# Patient Record
Sex: MALE | Race: OTHER | Employment: FULL TIME | URBAN - METROPOLITAN AREA
[De-identification: names, ages, dates, MRNs, and addresses within clinical notes are randomized per-mention and may not be internally consistent; named-entity substitution may affect disease eponyms.]

---

## 2017-08-21 ENCOUNTER — TELEPHONE (OUTPATIENT)
Dept: OTHER | Age: 27
End: 2017-08-21

## 2017-08-21 DIAGNOSIS — M54.41 CHRONIC RIGHT-SIDED LOW BACK PAIN WITH RIGHT-SIDED SCIATICA: Primary | ICD-10-CM

## 2017-08-21 DIAGNOSIS — G89.29 CHRONIC RIGHT-SIDED LOW BACK PAIN WITH RIGHT-SIDED SCIATICA: Primary | ICD-10-CM

## 2017-08-21 NOTE — TELEPHONE ENCOUNTER
Pt had 2 steroid injections for lumbar pain.   The injection works but is temporary,  Requesting referral to see either ortho or chiropractor

## 2018-02-18 ENCOUNTER — HOSPITAL ENCOUNTER (OUTPATIENT)
Age: 28
Discharge: HOME | End: 2018-02-18
Attending: FAMILY MEDICINE
Payer: COMMERCIAL

## 2018-02-18 ENCOUNTER — APPOINTMENT (OUTPATIENT)
Dept: GENERAL RADIOLOGY | Age: 28
End: 2018-02-18
Attending: FAMILY MEDICINE
Payer: COMMERCIAL

## 2018-02-18 VITALS
TEMPERATURE: 97 F | SYSTOLIC BLOOD PRESSURE: 119 MMHG | WEIGHT: 210 LBS | BODY MASS INDEX: 29.4 KG/M2 | OXYGEN SATURATION: 99 % | HEIGHT: 71 IN | HEART RATE: 85 BPM | RESPIRATION RATE: 20 BRPM | DIASTOLIC BLOOD PRESSURE: 74 MMHG

## 2018-02-18 DIAGNOSIS — S93.402A MILD SPRAIN OF LEFT ANKLE, INITIAL ENCOUNTER: Primary | ICD-10-CM

## 2018-02-18 PROCEDURE — 99213 OFFICE O/P EST LOW 20 MIN: CPT

## 2018-02-18 PROCEDURE — 99203 OFFICE O/P NEW LOW 30 MIN: CPT

## 2018-02-18 PROCEDURE — 73610 X-RAY EXAM OF ANKLE: CPT | Performed by: FAMILY MEDICINE

## 2018-02-18 RX ORDER — NAPROXEN 500 MG/1
500 TABLET ORAL 2 TIMES DAILY WITH MEALS
Qty: 28 TABLET | Refills: 0 | Status: SHIPPED | OUTPATIENT
Start: 2018-02-18 | End: 2018-03-04

## 2018-02-18 RX ORDER — IBUPROFEN 600 MG/1
600 TABLET ORAL ONCE
Status: COMPLETED | OUTPATIENT
Start: 2018-02-18 | End: 2018-02-18

## 2018-02-18 NOTE — ED PROVIDER NOTES
Patient presents with:  Lower Extremity Injury (musculoskeletal)    HPI:   Mata Monge is a 33year old male present with complain of L ankle injury.   Time of injury: yesterday while playing soccer  Location: lateral mallleolus  Nature/type of injury: twi Intact  - sensations: intact    ASSESSMENT AND PLAN:   Ankle Sprain    PLAN:   L ankle X-ray --   CONCLUSION:   1. No fracture or dislocation. Mild lateral malleolar soft tissue swelling.     Radiology report discussed and copy given to patient          Res

## 2018-02-18 NOTE — ED INITIAL ASSESSMENT (HPI)
Pt complaints of left ankle pain and swelling since yesterday reports was playing soccer not sure how it happened. Pt reports pain with bearing weight, pain when sitting also. Arrives with ace bandage in place.

## 2018-06-05 ENCOUNTER — LAB ENCOUNTER (OUTPATIENT)
Dept: LAB | Age: 28
End: 2018-06-05
Attending: FAMILY MEDICINE
Payer: COMMERCIAL

## 2018-06-05 ENCOUNTER — OFFICE VISIT (OUTPATIENT)
Dept: FAMILY MEDICINE CLINIC | Facility: CLINIC | Age: 28
End: 2018-06-05

## 2018-06-05 ENCOUNTER — HOSPITAL ENCOUNTER (OUTPATIENT)
Dept: GENERAL RADIOLOGY | Age: 28
Discharge: HOME | End: 2018-06-05
Attending: FAMILY MEDICINE
Payer: COMMERCIAL

## 2018-06-05 VITALS
HEART RATE: 62 BPM | BODY MASS INDEX: 31.64 KG/M2 | TEMPERATURE: 97 F | DIASTOLIC BLOOD PRESSURE: 75 MMHG | SYSTOLIC BLOOD PRESSURE: 117 MMHG | WEIGHT: 226 LBS | HEIGHT: 71 IN

## 2018-06-05 DIAGNOSIS — R10.30 LOWER ABDOMINAL PAIN: ICD-10-CM

## 2018-06-05 DIAGNOSIS — R19.06 EPIGASTRIC FULLNESS: ICD-10-CM

## 2018-06-05 DIAGNOSIS — R14.2 BELCHING SYMPTOM: ICD-10-CM

## 2018-06-05 DIAGNOSIS — R12 HEARTBURN: Primary | ICD-10-CM

## 2018-06-05 DIAGNOSIS — R12 HEARTBURN: ICD-10-CM

## 2018-06-05 PROCEDURE — 83690 ASSAY OF LIPASE: CPT

## 2018-06-05 PROCEDURE — 80053 COMPREHEN METABOLIC PANEL: CPT

## 2018-06-05 PROCEDURE — 36415 COLL VENOUS BLD VENIPUNCTURE: CPT

## 2018-06-05 PROCEDURE — 99214 OFFICE O/P EST MOD 30 MIN: CPT | Performed by: FAMILY MEDICINE

## 2018-06-05 PROCEDURE — 74018 RADEX ABDOMEN 1 VIEW: CPT | Performed by: FAMILY MEDICINE

## 2018-06-05 PROCEDURE — 99212 OFFICE O/P EST SF 10 MIN: CPT | Performed by: FAMILY MEDICINE

## 2018-06-05 PROCEDURE — 85025 COMPLETE CBC W/AUTO DIFF WBC: CPT

## 2018-06-05 NOTE — PROGRESS NOTES
HPI:    Patient ID: Laurence Daniel is a 33year old male. HPI     Patient here with c/o heartburn and epigastric fullness. Patient states symptoms have been going on for about a year. He had heartburn and he has been taking omeprazole.   This is helped h INTERNAL  - CBC WITH DIFFERENTIAL WITH PLATELET; Future  - COMP METABOLIC PANEL (14); Future    3. Epigastric fullness    - GASTRO - INTERNAL  - CBC WITH DIFFERENTIAL WITH PLATELET; Future  - COMP METABOLIC PANEL (14); Future  - LIPASE; Future    4.  Lower

## 2018-06-21 ENCOUNTER — TELEPHONE (OUTPATIENT)
Dept: GASTROENTEROLOGY | Facility: CLINIC | Age: 28
End: 2018-06-21

## 2018-06-21 ENCOUNTER — OFFICE VISIT (OUTPATIENT)
Dept: GASTROENTEROLOGY | Facility: CLINIC | Age: 28
End: 2018-06-21

## 2018-06-21 VITALS
SYSTOLIC BLOOD PRESSURE: 112 MMHG | DIASTOLIC BLOOD PRESSURE: 60 MMHG | BODY MASS INDEX: 31.1 KG/M2 | HEIGHT: 71 IN | WEIGHT: 222.19 LBS | HEART RATE: 67 BPM

## 2018-06-21 DIAGNOSIS — R10.13 EPIGASTRIC DISCOMFORT: Primary | ICD-10-CM

## 2018-06-21 DIAGNOSIS — K21.9 GASTROESOPHAGEAL REFLUX DISEASE, ESOPHAGITIS PRESENCE NOT SPECIFIED: ICD-10-CM

## 2018-06-21 DIAGNOSIS — K59.00 CONSTIPATION, UNSPECIFIED CONSTIPATION TYPE: ICD-10-CM

## 2018-06-21 PROCEDURE — 99243 OFF/OP CNSLTJ NEW/EST LOW 30: CPT | Performed by: INTERNAL MEDICINE

## 2018-06-21 NOTE — TELEPHONE ENCOUNTER
LM for pt that OK to schedule US abdomen at this time by calling central scheduling at [phone], no PA is required b/c he has HMO. Told to c/b if he has further questions or concerns.

## 2018-06-21 NOTE — PATIENT INSTRUCTIONS
1. Schedule upper endoscopy (EGD) with monitored anesthesia care (MAC) with Dr. Alton Kaiser    2. Start miralax (generic)    Miralax  Start taking miralax (or generic equivalent) once a day, which you can buy over the counter.   To take this, mix a 1/2 capful

## 2018-06-21 NOTE — TELEPHONE ENCOUNTER
GI Staff:    Please obtain prior auth for ultrasound    Thanks    Cal George MD  [address] [facility] - Gastroenterology

## 2018-06-21 NOTE — TELEPHONE ENCOUNTER
Scheduled for:  EGD  Provider Name:   Date:  6-27-18  Location:  [facility]  Sedation:  MAC  Time:  PT aware [facility] will call with arrival time   Prep: NPO after midnight  Meds/Allergies Reconciled?:  yes  Diagnosis with codes:  Epigastric discomfort R10.13, GERD

## 2018-06-21 NOTE — H&P
[facility] - Gastroenterology                                                                                                  Clinic History and Physical Disp: 60 tablet Rfl: 2       Allergies:  No Known Allergies    ROS:   all 10 systems were reviewed and were negative except as noted in the HPI    PHYSICAL EXAM:   Blood pressure 112/60, pulse 67, height 5' 11\" (1.803 m), weight 222 lb 3.2 oz (100.8 kg). he/she does not have. Also, alternatives like surgical correction has associated higher risks and potential for gas/bloat symptoms subsequently and also high likelihood of still requiring PPIs post surgery.       Recommend:  -EGD with MAC  -ultrasound of th

## 2018-06-26 ENCOUNTER — TELEPHONE (OUTPATIENT)
Dept: GASTROENTEROLOGY | Facility: CLINIC | Age: 28
End: 2018-06-26

## 2018-06-26 NOTE — TELEPHONE ENCOUNTER
Received a call from outpatient surgery that patients referral for EGD tomorrow is still opened and won't be covered unless it is closed. I spoke with Rebecca Fuentes in [facility] and she will send it high priority to get it reviewed and closed.   I will follow

## 2018-06-27 ENCOUNTER — LAB REQUISITION (OUTPATIENT)
Dept: LAB | Facility: HOSPITAL | Age: 28
End: 2018-06-27
Payer: COMMERCIAL

## 2018-06-27 ENCOUNTER — TELEPHONE (OUTPATIENT)
Dept: GASTROENTEROLOGY | Facility: CLINIC | Age: 28
End: 2018-06-27

## 2018-06-27 DIAGNOSIS — Z01.89 ENCOUNTER FOR OTHER SPECIFIED SPECIAL EXAMINATIONS: ICD-10-CM

## 2018-06-27 PROCEDURE — 88312 SPECIAL STAINS GROUP 1: CPT | Performed by: INTERNAL MEDICINE

## 2018-06-27 PROCEDURE — 88305 TISSUE EXAM BY PATHOLOGIST: CPT | Performed by: INTERNAL MEDICINE

## 2018-06-27 RX ORDER — PANTOPRAZOLE SODIUM 40 MG/1
40 TABLET, DELAYED RELEASE ORAL
Qty: 90 TABLET | Refills: 1 | Status: SHIPPED | OUTPATIENT
Start: 2018-06-27 | End: 2018-08-26

## 2018-06-27 NOTE — TELEPHONE ENCOUNTER
Patient at [facility] today for EGD for gerd, epigastric pain, and bloating. Had LA grade C esophagitis. Discussed need start a PPI once daily. Sending new prescription.     Zach Jean MD  [facility] - Gastroenterology  6/27/2018  3:08 PM

## 2018-09-10 ENCOUNTER — HOSPITAL ENCOUNTER (OUTPATIENT)
Dept: ULTRASOUND IMAGING | Facility: HOSPITAL | Age: 28
Discharge: HOME | End: 2018-09-10
Attending: INTERNAL MEDICINE
Payer: COMMERCIAL

## 2018-09-10 DIAGNOSIS — R10.13 EPIGASTRIC DISCOMFORT: ICD-10-CM

## 2018-09-10 PROCEDURE — 76705 ECHO EXAM OF ABDOMEN: CPT | Performed by: INTERNAL MEDICINE

## 2018-09-13 ENCOUNTER — OFFICE VISIT (OUTPATIENT)
Dept: GASTROENTEROLOGY | Facility: CLINIC | Age: 28
End: 2018-09-13
Payer: COMMERCIAL

## 2018-09-13 VITALS
HEIGHT: 71 IN | SYSTOLIC BLOOD PRESSURE: 120 MMHG | DIASTOLIC BLOOD PRESSURE: 78 MMHG | WEIGHT: 226 LBS | RESPIRATION RATE: 18 BRPM | HEART RATE: 78 BPM | BODY MASS INDEX: 31.64 KG/M2

## 2018-09-13 DIAGNOSIS — K21.00 GASTROESOPHAGEAL REFLUX DISEASE WITH ESOPHAGITIS: Primary | ICD-10-CM

## 2018-09-13 DIAGNOSIS — R14.0 BLOATING: ICD-10-CM

## 2018-09-13 PROCEDURE — 99214 OFFICE O/P EST MOD 30 MIN: CPT | Performed by: INTERNAL MEDICINE

## 2018-09-13 RX ORDER — PANTOPRAZOLE SODIUM 20 MG/1
20 TABLET, DELAYED RELEASE ORAL
COMMUNITY

## 2018-09-13 NOTE — PROGRESS NOTES
[facility] - Gastroenterology                                                                                                  Clinic Progress Note    Patient pr (750 mg total) by mouth 2 (two) times daily. Disp: 60 tablet Rfl: 2       Allergies:  No Known Allergies    ROS:   all 10 systems were reviewed and were negative except as noted in the HPI    PHYSICAL EXAM:   Blood pressure 120/78, pulse 78, resp.  rate 18, DM. Discussed reasons for weight loss and implications of fatty liver which could include cirrhosis, though not at this time.     Recommend:  -pantoprazole by mouth once daily  -weight loss  -low FODMAP diet    Orders This Visit:  No orders of the defined t

## 2018-09-13 NOTE — PATIENT INSTRUCTIONS
1. Continue your pantoprazole/protonix by mouth once a day     Let me know if you need a refill or run out    2. Here is some information on the FODMAP diet we discussd    FODMAPs  FODMAP diet:     It was found out that certain short-chained carbohydrates c and raffinose) are chains of fructose with one galactose molecule on the end. They act much like fructans. Main galactans-rich foods are legumes (soy, beans, chickpeas, lentils), cabbage and brussel sprouts. • Disaccharides:   o Lactose (milk sugar).  Lact 5. Symptoms of vitamin and mineral deficiency  6. Headache, lethargy, depression  7.    Approach to Low-FODMAPs Diet  In unexplained chronic diarrhea or bloating, FODMAPs should be considered as a possible cause, so their amount in the diet should be [facility]

## 2019-03-04 ENCOUNTER — OFFICE VISIT (OUTPATIENT)
Dept: FAMILY MEDICINE CLINIC | Facility: CLINIC | Age: 29
End: 2019-03-04
Payer: COMMERCIAL

## 2019-03-04 ENCOUNTER — NURSE TRIAGE (OUTPATIENT)
Dept: OTHER | Age: 29
End: 2019-03-04

## 2019-03-04 VITALS
TEMPERATURE: 97 F | HEART RATE: 73 BPM | WEIGHT: 204 LBS | HEIGHT: 71 IN | DIASTOLIC BLOOD PRESSURE: 81 MMHG | SYSTOLIC BLOOD PRESSURE: 121 MMHG | BODY MASS INDEX: 28.56 KG/M2

## 2019-03-04 DIAGNOSIS — J01.90 ACUTE SINUSITIS, RECURRENCE NOT SPECIFIED, UNSPECIFIED LOCATION: Primary | ICD-10-CM

## 2019-03-04 DIAGNOSIS — R05.9 COUGH: ICD-10-CM

## 2019-03-04 PROCEDURE — 99213 OFFICE O/P EST LOW 20 MIN: CPT | Performed by: FAMILY MEDICINE

## 2019-03-04 PROCEDURE — 99212 OFFICE O/P EST SF 10 MIN: CPT | Performed by: FAMILY MEDICINE

## 2019-03-04 RX ORDER — GUAIFENESIN AND CODEINE PHOSPHATE 100; 10 MG/5ML; MG/5ML
5 SOLUTION ORAL NIGHTLY PRN
Qty: 118 ML | Refills: 0 | Status: SHIPPED
Start: 2019-03-04 | End: 2019-03-18

## 2019-03-04 RX ORDER — AMOXICILLIN 875 MG/1
875 TABLET, COATED ORAL 2 TIMES DAILY
Qty: 20 TABLET | Refills: 0 | Status: SHIPPED | OUTPATIENT
Start: 2019-03-04 | End: 2019-03-14

## 2019-03-04 NOTE — TELEPHONE ENCOUNTER
Action Requested: Summary for Provider     []  Critical Lab, Recommendations Needed  [] Need Additional Advice  []   FYI    []   Need Orders  [] Need Medications Sent to Pharmacy  []  Other     SUMMARY: appt scheduled today to see Dr Claire Lewis for fernando

## 2019-03-04 NOTE — PROGRESS NOTES
HPI:    Patient ID: Cristofer Alfonso is a 33year old male. HPI  Patient presents with:  Cough: X 4 days   Wheezing    Patient had cold symptoms for last 1 1/2 weeks  Cough has lingered since last 4 days with wheezing  Coughing mainly at night.   Has some na normal heart sounds. Pulmonary/Chest: Effort normal and breath sounds normal.              ASSESSMENT/PLAN:   Acute sinusitis, recurrence not specified, unspecified location  (primary encounter diagnosis)  Cough    1.  Acute sinusitis, recurrence not spec

## 2021-01-11 ENCOUNTER — OFFICE VISIT (OUTPATIENT)
Dept: DERMATOLOGY CLINIC | Facility: CLINIC | Age: 31
End: 2021-01-11
Payer: COMMERCIAL

## 2021-01-11 DIAGNOSIS — D23.9 BENIGN NEOPLASM OF SKIN, UNSPECIFIED LOCATION: ICD-10-CM

## 2021-01-11 DIAGNOSIS — D23.60 BENIGN NEOPLASM OF SKIN OF UPPER LIMB, INCLUDING SHOULDER, UNSPECIFIED LATERALITY: ICD-10-CM

## 2021-01-11 DIAGNOSIS — L72.0 EPIDERMAL CYST: ICD-10-CM

## 2021-01-11 DIAGNOSIS — D23.5 BENIGN NEOPLASM OF SKIN OF TRUNK, EXCEPT SCROTUM: ICD-10-CM

## 2021-01-11 DIAGNOSIS — D23.4 BENIGN NEOPLASM OF SCALP AND SKIN OF NECK: ICD-10-CM

## 2021-01-11 DIAGNOSIS — D23.30 BENIGN NEOPLASM OF SKIN OF FACE: ICD-10-CM

## 2021-01-11 DIAGNOSIS — D23.5 DERMATOFIBROMA OF BACK: Primary | ICD-10-CM

## 2021-01-11 PROCEDURE — 99202 OFFICE O/P NEW SF 15 MIN: CPT | Performed by: DERMATOLOGY

## 2021-01-18 NOTE — PROGRESS NOTES
Candie Gaytan is a 35year old male. HPI:     CC:  Patient presents with:  Lesion: New pt presents with hyperpigmented, raised lesions on back. Occasionally tender (3/10). Denies family and personal hx of skin ca.          Allergies:  Patient has no known a Gets together: Not on file        Attends Buddhist service: Not on file        Active member of club or organization: Not on file        Attends meetings of clubs or organizations: Not on file        Relationship status: Not on file      Intimate partner allergies reviewed as noted. ROS:  Denies any other systemic complaints. No new or changeing lesions other than noted above. No fevers, chills, night sweats, unusual sun sensitivity. No other skin complaints.         History, medications, allergies r contact us if this does flare. We will continue to grow. Few other small scattered nevi over the trunk neck face. Reassurance given continue sun protection. No suspicious lesions presently  Please refer to map for specific lesions.   See additional di

## 2021-02-08 ENCOUNTER — OFFICE VISIT (OUTPATIENT)
Dept: FAMILY MEDICINE CLINIC | Facility: CLINIC | Age: 31
End: 2021-02-08
Payer: COMMERCIAL

## 2021-02-08 VITALS
TEMPERATURE: 97 F | HEART RATE: 81 BPM | BODY MASS INDEX: 30.94 KG/M2 | DIASTOLIC BLOOD PRESSURE: 70 MMHG | HEIGHT: 71 IN | SYSTOLIC BLOOD PRESSURE: 130 MMHG | WEIGHT: 221 LBS

## 2021-02-08 DIAGNOSIS — F41.9 ANXIETY: ICD-10-CM

## 2021-02-08 DIAGNOSIS — G47.8 UNREFRESHED BY SLEEP: ICD-10-CM

## 2021-02-08 DIAGNOSIS — Z00.00 ADULT GENERAL MEDICAL EXAM: Primary | ICD-10-CM

## 2021-02-08 DIAGNOSIS — F51.04 PSYCHOPHYSIOLOGICAL INSOMNIA: ICD-10-CM

## 2021-02-08 DIAGNOSIS — Z23 NEED FOR VACCINATION: ICD-10-CM

## 2021-02-08 PROCEDURE — 3078F DIAST BP <80 MM HG: CPT | Performed by: FAMILY MEDICINE

## 2021-02-08 PROCEDURE — 99213 OFFICE O/P EST LOW 20 MIN: CPT | Performed by: FAMILY MEDICINE

## 2021-02-08 PROCEDURE — 90715 TDAP VACCINE 7 YRS/> IM: CPT | Performed by: FAMILY MEDICINE

## 2021-02-08 PROCEDURE — 90471 IMMUNIZATION ADMIN: CPT | Performed by: FAMILY MEDICINE

## 2021-02-08 PROCEDURE — 3008F BODY MASS INDEX DOCD: CPT | Performed by: FAMILY MEDICINE

## 2021-02-08 PROCEDURE — 99395 PREV VISIT EST AGE 18-39: CPT | Performed by: FAMILY MEDICINE

## 2021-02-08 PROCEDURE — 3075F SYST BP GE 130 - 139MM HG: CPT | Performed by: FAMILY MEDICINE

## 2021-02-08 RX ORDER — TRAZODONE HYDROCHLORIDE 50 MG/1
TABLET ORAL
Qty: 180 TABLET | Refills: 0 | Status: SHIPPED | OUTPATIENT
Start: 2021-02-08 | End: 2021-06-12

## 2021-02-08 NOTE — PROGRESS NOTES
Patient ID: Larissa Keller is a 35year old male. HPI  Patient presents with:  Sleep Problem: Melatonin 10 mg not working  Physical: not fasting     Last seen by me on 12/13/2016. Pt presents today for a physical but also c/o loss of sleep.     Pt is a have to remain on them for the rest of his life if he begins taking them. I counseled him on the safety of these medications and advised him that after one year, his dosage can be lowered and eventually he may no longer need the medication.  I counseled the 06/05/2018    CREATSERUM 0.84 06/05/2018    BUNCREA 7.1 (L) 06/05/2018    ANIONGAP 7 06/05/2018    GFRAA >60 06/05/2018    GFRNAA >60 06/05/2018    CA 9.4 06/05/2018     06/05/2018    K 4.0 06/05/2018     06/05/2018    CO2 29 06/05/2018    OSMO Worry: Not on file        Inability: Not on file      Transportation needs        Medical: Not on file        Non-medical: Not on file    Tobacco Use      Smoking status: Former Smoker        Types: Cigarettes      Smokeless tobacco: Never Used    [employer] Physical Exam      Physical Exam   Constitutional: He appears well-developed and well-nourished. No distress. Head: Normocephalic.    Right Ear: Tympanic membrane and ear canal normal.   Left Ear: Tympanic membrane and ear canal normal.   Nose: No mucos care of the children as he think so much at bedtime he is unable to go to sleep. Unrefreshed by sleep  -     traZODone HCl 50 MG Oral Tab; 1 by mouth nightly for sleep. If after 7 to 10 days still having trouble sleeping start taking 2 at the same time.

## 2021-02-09 ENCOUNTER — TELEPHONE (OUTPATIENT)
Dept: FAMILY MEDICINE CLINIC | Facility: CLINIC | Age: 31
End: 2021-02-09

## 2021-02-09 NOTE — TELEPHONE ENCOUNTER
Mary Julian, pharmacist at [facility] the Sertraline and Trazadone has a possible interaction. Using Trazadone together with Sertraline can increase the risk of serotonin syndrome. Please advise.

## 2021-04-15 DIAGNOSIS — F41.9 ANXIETY: ICD-10-CM

## 2021-04-15 NOTE — TELEPHONE ENCOUNTER
Current Outpatient Medications:   •  Sertraline HCl 50 MG Oral Tab, Take 1/2 tablet po daily for 1 week and then go to 1 full tablet each day from then on.  (for mood), Disp: 30 tablet, Rfl: 1  •

## 2021-06-10 DIAGNOSIS — F41.9 ANXIETY: ICD-10-CM

## 2021-06-11 NOTE — TELEPHONE ENCOUNTER
I called patient, not available. I left a voicemail, I will try again later before the end of the day.

## 2021-06-11 NOTE — TELEPHONE ENCOUNTER
He was supposed to follow-up with us 6 weeks after the initial encounter when I placed him on sertraline and he never did. I put him on for a video visit this Saturday at 9:30 AM which was my only open slot. All he has to do is return our text. Please inform him.

## 2021-06-12 ENCOUNTER — TELEPHONE (OUTPATIENT)
Dept: FAMILY MEDICINE CLINIC | Facility: CLINIC | Age: 31
End: 2021-06-12

## 2021-06-12 NOTE — PROGRESS NOTES
VIDEO VISIT PROGRESS NOTE  Todays date: 6/12/2021 9:03 AM        Most recent Nurse Triage message / Carolyn Grade message from patient:      Xiomara Walsh DO   Physician   Specialty:  Family Medicine   Telephone Encounter   Signed   Encounter Date:  6/10/2021 case we will be speaking to that designated person and all parties involved understand the disclaimer that goes along with the consent for the video check-in service as stated above. History of Present Illness:     Last seen by me on 2/8/2021.     Pt gatherings:   n/a  • Other:  n/a      Treatments tried: Zoloft     Symptoms since onset: Improving [x]   Worsening  []   Unchanged  []     Waxing/Waning  []  N/A  []            Physical Exam:   Limited examination due to this being a video visit       Pauly Grandmother    • Prostate Cancer Paternal Grandfather        Reviewed Social History:  Social History    Tobacco Use      Smoking status: Former Smoker        Types: Cigarettes      Smokeless tobacco: Never Used    Alcohol use: Not Currently      Comment: Electronically Signed: Orlando Castillo, 6/12/2021, 9:03 AM.    IIlya, DO,  personally performed the services described in this documentation. All medical record entries made by the scribe were at my direction and in my presence.   I have revi

## 2021-06-12 NOTE — TELEPHONE ENCOUNTER
Please set up a video visit follow-up with me in 6 weeks. It will be a medication/mood disorder follow-up.

## 2023-10-30 ENCOUNTER — HOSPITAL ENCOUNTER (OUTPATIENT)
Age: 33
Discharge: HOME | End: 2023-10-30

## 2023-10-30 VITALS
HEART RATE: 75 BPM | RESPIRATION RATE: 20 BRPM | OXYGEN SATURATION: 100 % | TEMPERATURE: 97 F | DIASTOLIC BLOOD PRESSURE: 85 MMHG | SYSTOLIC BLOOD PRESSURE: 132 MMHG

## 2023-10-30 DIAGNOSIS — J01.10 ACUTE NON-RECURRENT FRONTAL SINUSITIS: Primary | ICD-10-CM

## 2023-10-30 PROCEDURE — 99203 OFFICE O/P NEW LOW 30 MIN: CPT | Performed by: NURSE PRACTITIONER

## 2023-10-30 RX ORDER — PSEUDOEPHEDRINE HCL 120 MG/1
120 TABLET, FILM COATED, EXTENDED RELEASE ORAL EVERY 12 HOURS PRN
Qty: 10 TABLET | Refills: 0 | Status: SHIPPED | OUTPATIENT
Start: 2023-10-30 | End: 2023-11-29

## 2023-10-30 RX ORDER — AMOXICILLIN AND CLAVULANATE POTASSIUM 875; 125 MG/1; MG/1
1 TABLET, FILM COATED ORAL 2 TIMES DAILY
Qty: 14 TABLET | Refills: 0 | Status: SHIPPED | OUTPATIENT
Start: 2023-10-30 | End: 2023-11-06

## 2023-10-30 RX ORDER — TRIAMCINOLONE ACETONIDE 55 UG/1
1 SPRAY, METERED NASAL 2 TIMES DAILY
Qty: 10.8 ML | Refills: 0 | Status: SHIPPED | OUTPATIENT
Start: 2023-10-30

## 2023-10-30 NOTE — ED INITIAL ASSESSMENT (HPI)
Pt has had a \"cold\" for about 10 days with nasal congestion and sinus pressure. Pt did start to feel better but congestion and sinus pressure has not subsided. Last night pt started having severe sinus pressure and pain. Pt has facial tenderness and area is palpated pain radiate behind right eye. When pt bends over it caues pressure around right eye.

## 2025-06-30 ENCOUNTER — OFFICE VISIT (OUTPATIENT)
Dept: SURGERY | Facility: CLINIC | Age: 35
End: 2025-06-30
Payer: COMMERCIAL

## 2025-06-30 DIAGNOSIS — Z30.2 ENCOUNTER FOR STERILIZATION: Primary | ICD-10-CM

## 2025-06-30 PROCEDURE — 99204 OFFICE O/P NEW MOD 45 MIN: CPT | Performed by: UROLOGY

## 2025-06-30 PROCEDURE — G2211 COMPLEX E/M VISIT ADD ON: HCPCS | Performed by: UROLOGY

## 2025-06-30 RX ORDER — TRAMADOL HYDROCHLORIDE 50 MG/1
50 TABLET ORAL EVERY 6 HOURS PRN
Qty: 15 TABLET | Refills: 0 | Status: SHIPPED | OUTPATIENT
Start: 2025-06-30

## 2025-06-30 RX ORDER — DIAZEPAM 10 MG/1
10 TABLET ORAL SEE ADMIN INSTRUCTIONS
Qty: 2 TABLET | Refills: 0 | Status: SHIPPED | OUTPATIENT
Start: 2025-06-30

## 2025-06-30 NOTE — H&P
HPI:     Aldo Juline is a 34 year old male who presents as a consult for vasectomy.    PCP - Sukumar    Number of children: 3    He desires permanent sterility via bilateral vasectomy. He understands the risks of bleeding, infection, chronic pain, atrophy, the one in 1000 risk of recanalization. He understands that he should continue to use contraception until we have a semen sample showing no sperm present. He also understands the need to wear a scrotal supporter for a minimum of one week. He also understands that he is not to lift anything heavier than 10 pounds for the next 4 days.  Patient was given vasectomy information and office protocol pre-operative and post-operative instructions.      exam: easily palpable vas bilaterally    Schedule for vasectomy.    HISTORY:  History reviewed. No pertinent past medical history.   History reviewed. No pertinent surgical history.   Family History   Problem Relation Age of Onset    Dementia Maternal Grandmother     Prostate Cancer Paternal Grandfather       Social History:   Social History     Socioeconomic History    Marital status:    Tobacco Use    Smoking status: Former     Types: Cigarettes    Smokeless tobacco: Never   Substance and Sexual Activity    Alcohol use: Not Currently     Comment: stop Dec. 2020    Drug use: No    Sexual activity: Yes     Partners: Female   Other Topics Concern    Caffeine Concern Yes     Comment: Coffee    Reaction to local anesthetic No        Medications (Active prior to today's visit):  Current Outpatient Medications   Medication Sig Dispense Refill    diazePAM (VALIUM) 10 MG Oral Tab Take 1 tablet (10 mg total) by mouth See Admin Instructions. Take 1-2 tablets by mouth 20-30 minutes before procedure. 2 tablet 0    traMADol 50 MG Oral Tab Take 1 tablet (50 mg total) by mouth every 6 (six) hours as needed for Pain. 15 tablet 0    triamcinolone 55 MCG/ACT Nasal Aerosol 1 spray by Nasal route 2 (two) times daily. 10.8 mL 0        Allergies:  No Known Allergies      ROS:     A comprehensive 10 point review of systems was completed.  Pertinent positives and negatives noted in the the HPI.    PHYSICAL EXAM:     GENERAL APPEARANCE: well, developed, well nourished, in no acute distress  NEUROLOGIC: nonfocal, alert and oriented  HEAD: normocephalic, atraumatic  EYES: sclera non-icteric  EARS: hearing intact  ORAL CAVITY: mucosa moist  NECK/THYROID: no obvious goiter or masses  LUNGS: nonlabored breathing  ABDOMEN: soft, no obvious masses or tenderness  SKIN: no obvious rashes    : as noted above     ASSESSMENT/PLAN:   Diagnoses and all orders for this visit:    Encounter for sterilization  -     diazePAM (VALIUM) 10 MG Oral Tab; Take 1 tablet (10 mg total) by mouth See Admin Instructions. Take 1-2 tablets by mouth 20-30 minutes before procedure.  -     traMADol 50 MG Oral Tab; Take 1 tablet (50 mg total) by mouth every 6 (six) hours as needed for Pain.    - as noted above    Thanks again for this consult.    Mauricio Castillo MD, FACS  Urologist  [facility]

## 2025-07-06 NOTE — PROGRESS NOTES
HPI:     Aldo Julien is a 34 year old male who presents as a consult for vasectomy.    PCP - Sukumar    Number of children: 3    He presents today for office vasectomy.  Procedure performed without issue. I reminded him to use contraception until after we verify he has a negative semen sample which he will submit in ~ 90 days.      PROCEDURE NOTE    PREOPERATIVE DIAGNOSIS: Desires voluntary sterilization - bilateral vasectomy.    POSTOPERATIVE DIAGNOSIS: same    PROCEDURE PERFORMED: Bilateral Vasectomy    ANESTHESIA: 1-% lidocaine without epinephrine injectable.     INDICATIONS:  He desires permanent sterility via bilateral vasectomy. He understands the risks of bleeding, infection, chronic pain, atrophy, the one in 1000 risk of recanalization. He understands that he should continue to use contraception until we have a semen sample showing no sperm present. He also understands the need to wear a scrotal supporter for a minimum of one week. He also understands that he is not to lift anything heavier than 10 pounds for the next 4 days.  Patient was given vasectomy information and office protocol pre-operative and post-operative instructions.     DESCRIPTION OF THE PROCEDURE:      He was placed in the supine position, prepped and draped in usual sterile fashion using Betadine. 2% lidocaine without epinephrine was used for local anesthetic. A bilateral vasectomy was performed. A small (~ 3 mm) skin incision was made overlying the vas bilaterally. A 1 to 1.5 cm. segment of the vas deferens was removed from each side, taking care not to damage any surrounding tissue. The free ends were tied with 3-0 prolene suture and the lumen cauterized with the cautery. Once no undue bleeding was noted, the ends returned to their normal anatomic location, and the skin edges were closed with 3-0 chromic sutures as well as dermabond glue. He was advised not to have unprotected intercourse until we have obtained a negative semen  analysis per office protocol explained in post-operative instructions.     The patient tolerated the procedure well and left in satisfactory condition without complaints & reminded to wear his athletic supporter for a minimum of 1 week.  __________________________________________    Prior note:    He desires permanent sterility via bilateral vasectomy. He understands the risks of bleeding, infection, chronic pain, atrophy, the one in 1000 risk of recanalization. He understands that he should continue to use contraception until we have a semen sample showing no sperm present. He also understands the need to wear a scrotal supporter for a minimum of one week. He also understands that he is not to lift anything heavier than 10 pounds for the next 4 days.  Patient was given vasectomy information and office protocol pre-operative and post-operative instructions.      exam: easily palpable vas bilaterally    Schedule for vasectomy.    HISTORY:  History reviewed. No pertinent past medical history.   History reviewed. No pertinent surgical history.   Family History   Problem Relation Age of Onset    Dementia Maternal Grandmother     Prostate Cancer Paternal Grandfather       Social History:   Social History     Socioeconomic History    Marital status:    Tobacco Use    Smoking status: Former     Types: Cigarettes    Smokeless tobacco: Never   Substance and Sexual Activity    Alcohol use: Not Currently     Comment: stop Dec. 2020    Drug use: No    Sexual activity: Yes     Partners: Female   Other Topics Concern    Caffeine Concern Yes     Comment: Coffee    Reaction to local anesthetic No        Medications (Active prior to today's visit):  Current Outpatient Medications   Medication Sig Dispense Refill    diazePAM (VALIUM) 10 MG Oral Tab Take 1 tablet (10 mg total) by mouth See Admin Instructions. Take 1-2 tablets by mouth 20-30 minutes before procedure. 2 tablet 0    traMADol 50 MG Oral Tab Take 1 tablet (50 mg  total) by mouth every 6 (six) hours as needed for Pain. 15 tablet 0       Allergies:  No Known Allergies      ROS:     A comprehensive 10 point review of systems was completed.  Pertinent positives and negatives noted in the the HPI.    PHYSICAL EXAM:     GENERAL APPEARANCE: well, developed, well nourished, in no acute distress  NEUROLOGIC: nonfocal, alert and oriented  HEAD: normocephalic, atraumatic  EYES: sclera non-icteric  EARS: hearing intact  ORAL CAVITY: mucosa moist  NECK/THYROID: no obvious goiter or masses  LUNGS: nonlabored breathing  ABDOMEN: soft, no obvious masses or tenderness  SKIN: no obvious rashes    : as noted above     ASSESSMENT/PLAN:   Diagnoses and all orders for this visit:    Encounter for sterilization  -     Semen Analysis, Postvas; Future  -     REMOVAL OF SPERM DUCT(S)    - as noted above    Thanks again for this consult.    Mauricio Castillo MD, FACS  Urologist  [facility]

## 2025-07-14 ENCOUNTER — PROCEDURE (OUTPATIENT)
Dept: SURGERY | Facility: CLINIC | Age: 35
End: 2025-07-14
Payer: COMMERCIAL

## 2025-07-14 VITALS — SYSTOLIC BLOOD PRESSURE: 141 MMHG | HEART RATE: 89 BPM | DIASTOLIC BLOOD PRESSURE: 80 MMHG

## 2025-07-14 DIAGNOSIS — Z30.2 ENCOUNTER FOR STERILIZATION: Primary | ICD-10-CM

## 2025-07-14 PROCEDURE — 3079F DIAST BP 80-89 MM HG: CPT | Performed by: UROLOGY

## 2025-07-14 PROCEDURE — 3077F SYST BP >= 140 MM HG: CPT | Performed by: UROLOGY

## 2025-07-14 PROCEDURE — 55250 REMOVAL OF SPERM DUCT(S): CPT | Performed by: UROLOGY

## (undated) NOTE — LETTER
06/10/21        [facility]  [address] [address]      Dear Francisgeovany Merritt,    [address] [facility] records indicate that you have outstanding lab work and or testing that was ordered for you and has not yet been completed:  Orders Placed This Encounter      CBC

## (undated) NOTE — LETTER
12/10/21        [facility]  [address] [address]      Dear Francisgeovany Merritt,    [address] [facility] records indicate that you have outstanding lab work and or testing that was ordered for you and has not yet been completed:  Orders Placed This Encounter      CBC

## (undated) NOTE — LETTER
09/10/21        [facility]  [address] [address]      Dear Giovanna Saleh,    [address] [facility] records indicate that you have outstanding lab work and or testing that was ordered for you and has not yet been completed:  Orders Placed This Encounter      CBC

## (undated) NOTE — LETTER
[address]  [address] [address]  Dept: [phone]      February 18, 2018    Patient: Barreto Levels   Date of Visit: 2/18/2018       To Whom It May Concern:    [facility]